# Patient Record
Sex: FEMALE | Race: WHITE | URBAN - NONMETROPOLITAN AREA
[De-identification: names, ages, dates, MRNs, and addresses within clinical notes are randomized per-mention and may not be internally consistent; named-entity substitution may affect disease eponyms.]

---

## 2022-12-12 ENCOUNTER — TELEPHONE (OUTPATIENT)
Dept: FAMILY MEDICINE | Facility: OTHER | Age: 67
End: 2022-12-12

## 2022-12-12 NOTE — TELEPHONE ENCOUNTER
Patient would like to discuss the results of her dexa scan and what are the options.      Thank you   Josseline Jeffers on 12/12/2022 at 12:28 PM

## 2022-12-12 NOTE — TELEPHONE ENCOUNTER
Closing encounter. Wrong patient chart was opened.     Dee Orourke LPN on 12/12/2022 at 1:10 PM